# Patient Record
Sex: FEMALE | Race: WHITE | Employment: UNEMPLOYED | URBAN - METROPOLITAN AREA
[De-identification: names, ages, dates, MRNs, and addresses within clinical notes are randomized per-mention and may not be internally consistent; named-entity substitution may affect disease eponyms.]

---

## 2020-08-27 NOTE — PROGRESS NOTES
[facility] [employer] / [facility] ([facility]) [address] [facility], [address]    Acknowledgment of Informed Consent for Surgical or Medical Procedure and Sedation  I agree to allow doctor(s) CRISTHIAN YAO and his/her associates or assistants, including residents and/or other qualified medical practitioner to perform the following medical treatment or procedure and to administer or direct the administration of sedation as necessary:  Procedure(s): PLANTAR PLATE REPAIR RIGHT FOOT, WEIL OSTEOTOMY RIGHT 2ND DIGIT, METATARSAL PHALANGEAL JOINT CAPSULOTOMY  My doctor has explained the following regarding the proposed procedure:   the explanation of the procedure   the benefits of the procedure   the potential problems that might occur during recuperation   the risks and side effects of the procedure which could include but are not limited to severe blood loss, infection, stroke or death   the benefits, risks and side effect of alternative procedures including the consequences of declining this procedure or any alternative procedures   the likelihood of achieving satisfactory results. I acknowledge no guarantee or assurance has been made to me regarding the results. I understand that during the course of this treatment/procedure, unforeseen conditions can occur which require an additional or different procedure. I agree to allow my physician or assistants to perform such extension of the original procedure as they may find necessary. I understand that sedation will often result in temporary impairment of memory and fine motor skills and that sedation can occasionally progress to a state of deep sedation or general anesthesia. I understand the risks of anesthesia for surgery include, but are not limited to, sore throat, hoarseness, injury to face, mouth, or teeth; nausea; headache; injury to blood vessels or nerves; death, brain damage, or paralysis.     I understand that if I have a Limitation of Treatment order in effect during my hospitalization, the order may or may not be in effect during this procedure. I give my doctor permission to give me blood or blood products. I understand that there are risks with receiving blood such as hepatitis, AIDS, fever, or allergic reaction. I acknowledge that the risks, benefits, and alternatives of this treatment have been explained to me and that no express or implied warranty has been given by the hospital, any blood bank, or any person or entity as to the blood or blood components transfused. At the discretion of my doctor, I agree to allow observers, equipment/product representatives and allow photographing, and/or televising of the procedure, provided my name or identity is maintained confidentially. I agree the hospital may dispose of or use for scientific or educational purposes any tissue, fluid, or body parts which may be removed.     ________________________________Date________Time______ am/pm  ([place] [facility])  Patient or Signature of Closest Relative or Legal Guardian    ________________________________Date________Time______am/pm      Page 1 of  1  Witness

## 2020-08-31 ENCOUNTER — NURSE ONLY (OUTPATIENT)
Dept: PRIMARY CARE CLINIC | Age: 35
End: 2020-08-31
Payer: COMMERCIAL

## 2020-08-31 PROCEDURE — 99211 OFF/OP EST MAY X REQ PHY/QHP: CPT | Performed by: NURSE PRACTITIONER

## 2020-09-01 SDOH — HEALTH STABILITY: MENTAL HEALTH: HOW OFTEN DO YOU HAVE A DRINK CONTAINING ALCOHOL?: NEVER

## 2020-09-01 NOTE — PROGRESS NOTES
[facility] PRE-SURGICAL TESTING INSTRUCTIONS                              PRIOR TO PROCEDURE DATE:  1. Please follow any guidelines/instructions prior to your procedure as advised by your surgeon. 2. Arrange for someone to drive you home and be with you for the first 24 hours after discharge for your safety after your procedure for which you received sedation. Ensure it is someone we can share information with regarding your discharge. 3. You must contact your surgeon for instructions IF:   You are taking any blood thinners, aspirin, anti-inflammatory or vitamin E.   There is a change in your physical condition such as a cold, fever, rash, cuts, sores or any other infection, especially near your surgical site. 4. Do not drink alcohol the day before or day of your procedure. 5. A Pre-op History and Physical for surgery MUST be completed by your Physician or Urgent Care within 30 days of your procedure date. Please bring a copy with you on the day of your procedure and along with any other testing performed. THE DAY OF YOUR PROCEDURE:  1. Follow instructions for ARRIVAL TIME as DIRECTED BY YOUR SURGEON. I    2. Enter the MAIN entrance from [email or web address] and follow the signs to the free [place] or [employer] parking (offered free of charge 6am-5pm). 3. Enter the Main Entrance of the hospital (do not enter from the lower level of the parking garage). Upon entrance, check in with the  at the main desk on your left. If no one is available at the desk, proceed into the [facility] Waiting Room and go through the door directly into the [facility]. There is a Check-in desk ACROSS from Room 5 (marked with a sign hanging from the ceiling). The phone number for the surgery center is [phone]. 4. Please call [phone] option #2 option #2 if you have not been preregistered yet. On the day of your procedure bring your insurance card and photo ID.  You will be registered at your bedside once brought back to your room. 5. DO NOT EAT ANYTHING eight hours prior to surgery. May have 8 ounces of water 4 hours prior to surgery. 6. MEDICATIONS    Take the following medications with a SMALL sip of water:    Use your usual dose of inhalers the morning of surgery. BRING your rescue inhaler with you to hospital.    Anesthesia does NOT want you to take insulin the morning of surgery. They will control your blood sugar while you are at the hospital. Please contact your ordering physician for instructions regarding your insulin the night before your procedure. If you have an insulin pump, please keep it set on basal rate. 7. Do not swallow water when brushing teeth. No gum, candy, mints or ice chips. Refrain from smoking or at least decrease the amount. 8. Dress in loose, comfortable clothing appropriate for redressing after your procedure. Do not wear jewelry (including body piercings), make-up (especially NO eye make-up), fingernail polish (NO toenail polish if foot/leg surgery), lotion, powders or metal hairclips. 9. Dentures, glasses, or contacts will need to be removed before your procedure. Bring cases for your glasses, contacts, dentures, or hearing aids to protect them while you are in surgery. 10. If you use a CPAP, please bring it with you on the day of your procedure. 11. We recommend that valuable personal  belongings such as cash, cell phones, e-tablets or jewelry, be left at home during your stay. The hospital will not be responsible for valuables that are not secured in the hospital safe. However, if your insurance requires a co-pay, you may want to bring a method of payment, i.e. Check or credit card, if you wish to pay your co-pay the day of surgery. 12. If you are to stay overnight, you may bring a bag with personal items.  Please have any large items you may need brought in by your family after your arrival to your hospital room.    13. If you have a Living Will or Durable Power of , please bring a copy on the day of your procedure. 15. With your permission, one family member may accompany you while you are being prepared for surgery. Once you are ready, additional family members may join you. HOW WE KEEP YOU SAFE and WORK TO PREVENT SURGICAL SITE INFECTIONS:  1. Health care workers should always check your ID bracelet to verify your name and birth date. You will be asked many times to state your name, date of birth, and allergies. 2. Health care workers should always clean their hands with soap or alcohol gel before providing care to you. It is okay to ask anyone if they cleaned their hands before they touch you. 3. You will be actively involved in verifying the type of procedure you are having and ensuring the correct surgical site. This will be confirmed multiple times prior to your procedure. Do NOT eren your surgery site UNLESS instructed to by your surgeon. 4. Do not shave or wax for 72 hours prior to procedure near your operative site. Shaving with a razor can irritate your skin and make it easier to develop an infection. On the day of your procedure, any hair that needs to be removed near the surgical site will be clipped by a healthcare worker using a special clippers designed to avoid skin irritation. 5. When you are in the operating room, your surgical site will be cleansed with a special soap, and in most cases, you will be given an antibiotic before the surgery begins. What to expect AFTER YOUR PROCEDURE:  1. Immediately following your procedure, your will be taken to the PACU for the first phase of your recovery. Your nurse will help you recover from any potential side effects of anesthesia, such as extreme drowsiness, changes in your vital signs or breathing patterns. Nausea, headache, muscle aches, or sore throat may also occur after anesthesia.   Your nurse will help you manage these

## 2020-09-01 NOTE — PROGRESS NOTES
Pt states she is having H&P done at urgent [facility] [place]. PAT fax given, pt advised she must fax us H&P.    9-3 Spoke with urgent [facility] [place], pt has not been in since July.  LM for pt to call us back to let us know where she went for H&P

## 2020-09-03 ENCOUNTER — ANESTHESIA EVENT (OUTPATIENT)
Dept: OPERATING ROOM | Age: 35
End: 2020-09-03
Payer: COMMERCIAL

## 2020-09-04 ENCOUNTER — HOSPITAL ENCOUNTER (OUTPATIENT)
Age: 35
Setting detail: OUTPATIENT SURGERY
Discharge: HOME | End: 2020-09-04
Attending: PODIATRIST | Admitting: PODIATRIST
Payer: COMMERCIAL

## 2020-09-04 ENCOUNTER — ANESTHESIA (OUTPATIENT)
Dept: OPERATING ROOM | Age: 35
End: 2020-09-04
Payer: COMMERCIAL

## 2020-09-04 ENCOUNTER — APPOINTMENT (OUTPATIENT)
Dept: GENERAL RADIOLOGY | Age: 35
End: 2020-09-04
Attending: PODIATRIST
Payer: COMMERCIAL

## 2020-09-04 VITALS
DIASTOLIC BLOOD PRESSURE: 52 MMHG | TEMPERATURE: 96.8 F | RESPIRATION RATE: 16 BRPM | OXYGEN SATURATION: 99 % | SYSTOLIC BLOOD PRESSURE: 97 MMHG

## 2020-09-04 VITALS
RESPIRATION RATE: 18 BRPM | BODY MASS INDEX: 36.1 KG/M2 | DIASTOLIC BLOOD PRESSURE: 72 MMHG | OXYGEN SATURATION: 95 % | HEART RATE: 88 BPM | WEIGHT: 230 LBS | TEMPERATURE: 97.1 F | HEIGHT: 67 IN | SYSTOLIC BLOOD PRESSURE: 123 MMHG

## 2020-09-04 LAB
HCT VFR BLD CALC: 42.4 % (ref 36–48)
HEMOGLOBIN: 14.3 G/DL (ref 12–16)
MCH RBC QN AUTO: 30.5 PG (ref 26–34)
MCHC RBC AUTO-ENTMCNC: 33.7 G/DL (ref 31–36)
MCV RBC AUTO: 90.4 FL (ref 80–100)
PDW BLD-RTO: 13.8 % (ref 12.4–15.4)
PLATELET # BLD: 225 K/UL (ref 135–450)
PMV BLD AUTO: 8.4 FL (ref 5–10.5)
RBC # BLD: 4.69 M/UL (ref 4–5.2)
WBC # BLD: 8.7 K/UL (ref 4–11)

## 2020-09-04 PROCEDURE — C1713 ANCHOR/SCREW BN/BN,TIS/BN: HCPCS | Performed by: PODIATRIST

## 2020-09-04 PROCEDURE — 73630 X-RAY EXAM OF FOOT: CPT

## 2020-09-04 PROCEDURE — 3600000014 HC SURGERY LEVEL 4 ADDTL 15MIN: Performed by: PODIATRIST

## 2020-09-04 PROCEDURE — 6360000002 HC RX W HCPCS: Performed by: NURSE ANESTHETIST, CERTIFIED REGISTERED

## 2020-09-04 PROCEDURE — 2500000003 HC RX 250 WO HCPCS: Performed by: PODIATRIST

## 2020-09-04 PROCEDURE — 7100000000 HC PACU RECOVERY - FIRST 15 MIN: Performed by: PODIATRIST

## 2020-09-04 PROCEDURE — 3700000001 HC ADD 15 MINUTES (ANESTHESIA): Performed by: PODIATRIST

## 2020-09-04 PROCEDURE — 7100000001 HC PACU RECOVERY - ADDTL 15 MIN: Performed by: PODIATRIST

## 2020-09-04 PROCEDURE — 3600000004 HC SURGERY LEVEL 4 BASE: Performed by: PODIATRIST

## 2020-09-04 PROCEDURE — 2709999900 HC NON-CHARGEABLE SUPPLY: Performed by: PODIATRIST

## 2020-09-04 PROCEDURE — 6370000000 HC RX 637 (ALT 250 FOR IP): Performed by: PODIATRIST

## 2020-09-04 PROCEDURE — 2580000003 HC RX 258: Performed by: PODIATRIST

## 2020-09-04 PROCEDURE — 7100000010 HC PHASE II RECOVERY - FIRST 15 MIN: Performed by: PODIATRIST

## 2020-09-04 PROCEDURE — 2720000010 HC SURG SUPPLY STERILE: Performed by: PODIATRIST

## 2020-09-04 PROCEDURE — 7100000011 HC PHASE II RECOVERY - ADDTL 15 MIN: Performed by: PODIATRIST

## 2020-09-04 PROCEDURE — 2580000003 HC RX 258: Performed by: ANESTHESIOLOGY

## 2020-09-04 PROCEDURE — 2580000003 HC RX 258: Performed by: NURSE ANESTHETIST, CERTIFIED REGISTERED

## 2020-09-04 PROCEDURE — 6360000002 HC RX W HCPCS: Performed by: PODIATRIST

## 2020-09-04 PROCEDURE — 85027 COMPLETE CBC AUTOMATED: CPT

## 2020-09-04 PROCEDURE — 3700000000 HC ANESTHESIA ATTENDED CARE: Performed by: PODIATRIST

## 2020-09-04 DEVICE — IMPLANTABLE DEVICE
Type: IMPLANTABLE DEVICE | Site: FOOT | Status: FUNCTIONAL
Brand: GRAVITY

## 2020-09-04 DEVICE — GRAFT HUM TISS W2XL4CM THCK AMNIO BARR MEM CHORION BASE: Type: IMPLANTABLE DEVICE | Site: FOOT | Status: FUNCTIONAL

## 2020-09-04 RX ORDER — LABETALOL 20 MG/4 ML (5 MG/ML) INTRAVENOUS SYRINGE
5 EVERY 10 MIN PRN
Status: DISCONTINUED | OUTPATIENT
Start: 2020-09-04 | End: 2020-09-04 | Stop reason: HOSPADM

## 2020-09-04 RX ORDER — ENALAPRILAT 2.5 MG/2ML
1.25 INJECTION INTRAVENOUS
Status: DISCONTINUED | OUTPATIENT
Start: 2020-09-04 | End: 2020-09-04 | Stop reason: HOSPADM

## 2020-09-04 RX ORDER — ONDANSETRON 2 MG/ML
INJECTION INTRAMUSCULAR; INTRAVENOUS PRN
Status: DISCONTINUED | OUTPATIENT
Start: 2020-09-04 | End: 2020-09-04 | Stop reason: SDUPTHER

## 2020-09-04 RX ORDER — GABAPENTIN 300 MG/1
300 CAPSULE ORAL NIGHTLY
Qty: 7 CAPSULE | Refills: 0 | Status: SHIPPED | OUTPATIENT
Start: 2020-09-04 | End: 2022-07-29 | Stop reason: ALTCHOICE

## 2020-09-04 RX ORDER — ONDANSETRON 2 MG/ML
4 INJECTION INTRAMUSCULAR; INTRAVENOUS
Status: DISCONTINUED | OUTPATIENT
Start: 2020-09-04 | End: 2020-09-04 | Stop reason: HOSPADM

## 2020-09-04 RX ORDER — LIDOCAINE HYDROCHLORIDE 10 MG/ML
1 INJECTION, SOLUTION EPIDURAL; INFILTRATION; INTRACAUDAL; PERINEURAL
Status: DISCONTINUED | OUTPATIENT
Start: 2020-09-04 | End: 2020-09-04 | Stop reason: HOSPADM

## 2020-09-04 RX ORDER — SODIUM CHLORIDE, SODIUM LACTATE, POTASSIUM CHLORIDE, CALCIUM CHLORIDE 600; 310; 30; 20 MG/100ML; MG/100ML; MG/100ML; MG/100ML
INJECTION, SOLUTION INTRAVENOUS CONTINUOUS PRN
Status: DISCONTINUED | OUTPATIENT
Start: 2020-09-04 | End: 2020-09-04 | Stop reason: SDUPTHER

## 2020-09-04 RX ORDER — CLINDAMYCIN PHOSPHATE 900 MG/50ML
900 INJECTION INTRAVENOUS
Status: COMPLETED | OUTPATIENT
Start: 2020-09-04 | End: 2020-09-04

## 2020-09-04 RX ORDER — MAGNESIUM HYDROXIDE 1200 MG/15ML
LIQUID ORAL CONTINUOUS PRN
Status: COMPLETED | OUTPATIENT
Start: 2020-09-04 | End: 2020-09-04

## 2020-09-04 RX ORDER — HYDROMORPHONE HCL 110MG/55ML
PATIENT CONTROLLED ANALGESIA SYRINGE INTRAVENOUS PRN
Status: DISCONTINUED | OUTPATIENT
Start: 2020-09-04 | End: 2020-09-04 | Stop reason: SDUPTHER

## 2020-09-04 RX ORDER — SODIUM CHLORIDE, SODIUM LACTATE, POTASSIUM CHLORIDE, CALCIUM CHLORIDE 600; 310; 30; 20 MG/100ML; MG/100ML; MG/100ML; MG/100ML
INJECTION, SOLUTION INTRAVENOUS CONTINUOUS
Status: DISCONTINUED | OUTPATIENT
Start: 2020-09-04 | End: 2020-09-04 | Stop reason: HOSPADM

## 2020-09-04 RX ORDER — LIDOCAINE HYDROCHLORIDE 20 MG/ML
INJECTION, SOLUTION INTRAVENOUS PRN
Status: DISCONTINUED | OUTPATIENT
Start: 2020-09-04 | End: 2020-09-04 | Stop reason: SDUPTHER

## 2020-09-04 RX ORDER — MIDAZOLAM HYDROCHLORIDE 1 MG/ML
INJECTION INTRAMUSCULAR; INTRAVENOUS PRN
Status: DISCONTINUED | OUTPATIENT
Start: 2020-09-04 | End: 2020-09-04 | Stop reason: SDUPTHER

## 2020-09-04 RX ORDER — OXYCODONE HYDROCHLORIDE AND ACETAMINOPHEN 5; 325 MG/1; MG/1
1 TABLET ORAL
Status: COMPLETED | OUTPATIENT
Start: 2020-09-04 | End: 2020-09-04

## 2020-09-04 RX ORDER — SODIUM CHLORIDE 0.9 % (FLUSH) 0.9 %
10 SYRINGE (ML) INJECTION EVERY 12 HOURS SCHEDULED
Status: DISCONTINUED | OUTPATIENT
Start: 2020-09-04 | End: 2020-09-04 | Stop reason: HOSPADM

## 2020-09-04 RX ORDER — FENTANYL CITRATE 50 UG/ML
50 INJECTION, SOLUTION INTRAMUSCULAR; INTRAVENOUS EVERY 5 MIN PRN
Status: DISCONTINUED | OUTPATIENT
Start: 2020-09-04 | End: 2020-09-04 | Stop reason: HOSPADM

## 2020-09-04 RX ORDER — SODIUM CHLORIDE 0.9 % (FLUSH) 0.9 %
10 SYRINGE (ML) INJECTION PRN
Status: DISCONTINUED | OUTPATIENT
Start: 2020-09-04 | End: 2020-09-04 | Stop reason: HOSPADM

## 2020-09-04 RX ORDER — OXYCODONE HYDROCHLORIDE AND ACETAMINOPHEN 5; 325 MG/1; MG/1
1 TABLET ORAL EVERY 8 HOURS PRN
Qty: 21 TABLET | Refills: 0 | Status: SHIPPED | OUTPATIENT
Start: 2020-09-04 | End: 2020-09-11

## 2020-09-04 RX ORDER — PROPOFOL 10 MG/ML
INJECTION, EMULSION INTRAVENOUS PRN
Status: DISCONTINUED | OUTPATIENT
Start: 2020-09-04 | End: 2020-09-04 | Stop reason: SDUPTHER

## 2020-09-04 RX ORDER — FENTANYL CITRATE 50 UG/ML
25 INJECTION, SOLUTION INTRAMUSCULAR; INTRAVENOUS EVERY 5 MIN PRN
Status: DISCONTINUED | OUTPATIENT
Start: 2020-09-04 | End: 2020-09-04 | Stop reason: HOSPADM

## 2020-09-04 RX ORDER — IBUPROFEN 800 MG/1
800 TABLET ORAL EVERY 8 HOURS PRN
Qty: 21 TABLET | Refills: 0 | Status: SHIPPED | OUTPATIENT
Start: 2020-09-04 | End: 2022-08-09

## 2020-09-04 RX ORDER — FENTANYL CITRATE 50 UG/ML
INJECTION, SOLUTION INTRAMUSCULAR; INTRAVENOUS PRN
Status: DISCONTINUED | OUTPATIENT
Start: 2020-09-04 | End: 2020-09-04 | Stop reason: SDUPTHER

## 2020-09-04 RX ORDER — HYDRALAZINE HYDROCHLORIDE 20 MG/ML
5 INJECTION INTRAMUSCULAR; INTRAVENOUS EVERY 5 MIN PRN
Status: DISCONTINUED | OUTPATIENT
Start: 2020-09-04 | End: 2020-09-04 | Stop reason: HOSPADM

## 2020-09-04 RX ORDER — DEXAMETHASONE SODIUM PHOSPHATE 4 MG/ML
INJECTION, SOLUTION INTRA-ARTICULAR; INTRALESIONAL; INTRAMUSCULAR; INTRAVENOUS; SOFT TISSUE PRN
Status: DISCONTINUED | OUTPATIENT
Start: 2020-09-04 | End: 2020-09-04 | Stop reason: SDUPTHER

## 2020-09-04 RX ADMIN — SODIUM CHLORIDE, POTASSIUM CHLORIDE, SODIUM LACTATE AND CALCIUM CHLORIDE: 600; 310; 30; 20 INJECTION, SOLUTION INTRAVENOUS at 08:49

## 2020-09-04 RX ADMIN — CLINDAMYCIN PHOSPHATE 900 MG: 18 INJECTION, SOLUTION INTRAVENOUS at 10:50

## 2020-09-04 RX ADMIN — SODIUM CHLORIDE, SODIUM LACTATE, POTASSIUM CHLORIDE, AND CALCIUM CHLORIDE: 600; 310; 30; 20 INJECTION, SOLUTION INTRAVENOUS at 11:48

## 2020-09-04 RX ADMIN — PROPOFOL 50 MG: 10 INJECTION, EMULSION INTRAVENOUS at 10:46

## 2020-09-04 RX ADMIN — FENTANYL CITRATE 100 MCG: 50 INJECTION INTRAMUSCULAR; INTRAVENOUS at 10:50

## 2020-09-04 RX ADMIN — OXYCODONE HYDROCHLORIDE AND ACETAMINOPHEN 1 TABLET: 5; 325 TABLET ORAL at 13:13

## 2020-09-04 RX ADMIN — DEXAMETHASONE SODIUM PHOSPHATE 4 MG: 4 INJECTION, SOLUTION INTRAMUSCULAR; INTRAVENOUS at 11:10

## 2020-09-04 RX ADMIN — PROPOFOL 250 MG: 10 INJECTION, EMULSION INTRAVENOUS at 10:45

## 2020-09-04 RX ADMIN — FENTANYL CITRATE 100 MCG: 50 INJECTION INTRAMUSCULAR; INTRAVENOUS at 11:30

## 2020-09-04 RX ADMIN — PROPOFOL 50 MG: 10 INJECTION, EMULSION INTRAVENOUS at 10:50

## 2020-09-04 RX ADMIN — MIDAZOLAM HYDROCHLORIDE 2 MG: 2 INJECTION, SOLUTION INTRAMUSCULAR; INTRAVENOUS at 10:38

## 2020-09-04 RX ADMIN — LIDOCAINE HYDROCHLORIDE 100 MG: 20 INJECTION, SOLUTION INTRAVENOUS at 10:45

## 2020-09-04 RX ADMIN — SODIUM CHLORIDE, SODIUM LACTATE, POTASSIUM CHLORIDE, AND CALCIUM CHLORIDE: 600; 310; 30; 20 INJECTION, SOLUTION INTRAVENOUS at 10:33

## 2020-09-04 RX ADMIN — PROPOFOL 50 MG: 10 INJECTION, EMULSION INTRAVENOUS at 11:55

## 2020-09-04 RX ADMIN — HYDROMORPHONE HYDROCHLORIDE 1 MG: 2 INJECTION, SOLUTION INTRAMUSCULAR; INTRAVENOUS; SUBCUTANEOUS at 12:12

## 2020-09-04 RX ADMIN — ONDANSETRON 4 MG: 2 INJECTION INTRAMUSCULAR; INTRAVENOUS at 11:10

## 2020-09-04 ASSESSMENT — PULMONARY FUNCTION TESTS
PIF_VALUE: 8
PIF_VALUE: 8
PIF_VALUE: 14
PIF_VALUE: 14
PIF_VALUE: 0
PIF_VALUE: 6
PIF_VALUE: 1
PIF_VALUE: 13
PIF_VALUE: 11
PIF_VALUE: 7
PIF_VALUE: 8
PIF_VALUE: 7
PIF_VALUE: 6
PIF_VALUE: 7
PIF_VALUE: 7
PIF_VALUE: 9
PIF_VALUE: 14
PIF_VALUE: 6
PIF_VALUE: 8
PIF_VALUE: 23
PIF_VALUE: 8
PIF_VALUE: 7
PIF_VALUE: 7
PIF_VALUE: 14
PIF_VALUE: 6
PIF_VALUE: 7
PIF_VALUE: 8
PIF_VALUE: 8
PIF_VALUE: 14
PIF_VALUE: 7
PIF_VALUE: 7
PIF_VALUE: 14
PIF_VALUE: 5
PIF_VALUE: 7
PIF_VALUE: 7
PIF_VALUE: 6
PIF_VALUE: 7
PIF_VALUE: 8
PIF_VALUE: 7
PIF_VALUE: 0
PIF_VALUE: 6
PIF_VALUE: 14
PIF_VALUE: 14
PIF_VALUE: 2
PIF_VALUE: 7
PIF_VALUE: 0
PIF_VALUE: 8
PIF_VALUE: 8
PIF_VALUE: 14
PIF_VALUE: 7
PIF_VALUE: 7
PIF_VALUE: 9
PIF_VALUE: 7
PIF_VALUE: 6
PIF_VALUE: 7
PIF_VALUE: 13
PIF_VALUE: 7
PIF_VALUE: 8
PIF_VALUE: 10
PIF_VALUE: 7
PIF_VALUE: 0
PIF_VALUE: 7
PIF_VALUE: 4
PIF_VALUE: 11
PIF_VALUE: 8
PIF_VALUE: 7
PIF_VALUE: 7
PIF_VALUE: 14
PIF_VALUE: 14
PIF_VALUE: 8
PIF_VALUE: 6
PIF_VALUE: 7
PIF_VALUE: 6
PIF_VALUE: 7
PIF_VALUE: 7
PIF_VALUE: 8
PIF_VALUE: 8
PIF_VALUE: 5
PIF_VALUE: 9
PIF_VALUE: 1
PIF_VALUE: 9
PIF_VALUE: 8
PIF_VALUE: 5
PIF_VALUE: 5
PIF_VALUE: 7
PIF_VALUE: 6
PIF_VALUE: 7
PIF_VALUE: 5
PIF_VALUE: 5
PIF_VALUE: 7
PIF_VALUE: 8
PIF_VALUE: 7
PIF_VALUE: 14
PIF_VALUE: 14
PIF_VALUE: 8
PIF_VALUE: 8
PIF_VALUE: 7
PIF_VALUE: 7
PIF_VALUE: 14
PIF_VALUE: 13
PIF_VALUE: 7
PIF_VALUE: 0

## 2020-09-04 ASSESSMENT — PAIN DESCRIPTION - DESCRIPTORS: DESCRIPTORS: THROBBING

## 2020-09-04 ASSESSMENT — PAIN SCALES - GENERAL
PAINLEVEL_OUTOF10: 7
PAINLEVEL_OUTOF10: 5
PAINLEVEL_OUTOF10: 0

## 2020-09-04 ASSESSMENT — PAIN DESCRIPTION - ORIENTATION: ORIENTATION: RIGHT

## 2020-09-04 ASSESSMENT — PAIN - FUNCTIONAL ASSESSMENT: PAIN_FUNCTIONAL_ASSESSMENT: 0-10

## 2020-09-04 ASSESSMENT — PAIN DESCRIPTION - LOCATION: LOCATION: LEG

## 2020-09-04 ASSESSMENT — PAIN DESCRIPTION - PAIN TYPE: TYPE: ACUTE PAIN;SURGICAL PAIN

## 2020-09-04 NOTE — PROGRESS NOTES
Ambulatory Surgery/Procedure Discharge Note    Vitals:    09/04/20 1342   BP: 123/72   Pulse: 88   Resp: 18   Temp: 97.1 °F (36.2 °C)   SpO2: 95%     Patient meets discharge criteria based on Jeniffer score. In: 2596 [P.O.:240; I.V.:2356]  Out: -     Restroom use offered before discharge. Yes    Pain assessment:  none  Pain Level: 0    Discharge instructions reviewed with patients . 3 prescriptions given to be filled at pharmacy of choice. Ice packs given, Dressing clean, dry and intact. All needs met at this time. Patient discharged to home/self care.  Patient discharged via wheel chair by transporter to waiting family/S.O.       9/4/2020 2:17 PM

## 2020-09-04 NOTE — OP NOTE
Operative Note      Patient: Arik Flores  YOB: 1985  MRN: [id number]    Date of Procedure: 9/4/2020     Pre-Op Diagnosis: Sprain of metatarsophalangeal joint Flexion deformity, right ankle and toes [M21.271], Flexion deformity, right ankle and toes [M21.271], Subluxation of metatarsophalangeal joint of lesser toe of right foot, initial encounter [S93.144A]     Post-Op Diagnosis: Same       Procedure(s):  PLANTAR PLATE REPAIR RIGHT FOOT, WEIL OSTEOTOMY RIGHT 2ND DIGIT, METATARSAL PHALANGEAL JOINT CAPSULOTOMY     Surgeon(s):  Carter Alanis DPM     Assistant:  Resident: EBONIE Meyer MS4     Anesthesia: General     Injectables: pre-op 10 cc 1% Polocaine and post-op 10 cc 1% Polocaine      Hemostasis: pneumatic ankle tourniquet at 250 mmHg for 60 minutes     Materials: 3-0 Vicryl, 4-0 Vicryl, 5-0 Vicryl, [employer] Plantar Plate Repair Implant Set, 2.0 x 12 mm Snap off screw, Actishield Amniotic Barrier Membrane       Estimated Blood Loss: less than 5 cc     Complications: None     Specimens: Was Not Obtained     Findings: as expected, see op note    INDICATIONS FOR PROCEDURE: This patient has signs and symptoms clinically and radiographically consistent with the above mentioned preoperative diagnosis. Having failed conservative treatment, it was determined that the patient would benefit from surgical intervention. All potential risks, benefits, and complications were discussed with the patient prior to the scheduling of surgery. All the patient's questions were answered and no guarantees were given. The patient wished to proceed with surgery, and informed written consent was obtained. DETAILS OF PROCEDURE: The patient was brought from the pre-operative area and placed on the operating table in the supine position. A pneumatic ankle tourniquet was placed around the patient's well-padded right lower extremity.  Following IV sedation, a local anesthetic block was then injected proximal to the incision site consisting of 10 cc of 1% Polocaine plain. The right lower extremity was then scrubbed, prepped, and draped in the usual sterile fashion. A time-out was performed. The patient, procedure, and operative site were confirmed. An Esmarch bandage was then utilized to exsanguinate the patient's right lower extremity. The tourniquet was then inflated to 250 mmHg and the following procedure was performed. PROCEDURE #1-3 PLANTAR PLATE REPAIR, WEIL OSTEOTOMY WITH METATARSAL PHALANGEAL JOINT CAPSULOTOMY, RIGHT FOOT  Attention was directed to the dorsal aspect of the right foot where a 4 cm curvilinear incision line was drawn with a sterile marking pen over the dorsal aspect of the 2nd metatarsal and centering over the metarsophalangeal joint. The incision was deepened through the subcutaneous layer with care being taken to identify and retract all vital neurovascular structures. All venous tributaries were electrocoagulated as encountered. The extensor tendon was identified and retracted and protected. Sharp and blunt dissection continued to the level of the metatarsophalangeal joint. Using a #15 blade, a transverse capsulotomy was made from dorsal to plantar and medial to lateral at the metatarsophalangeal joint, thereby releasing the capsule. Special care was taken to preserve the attachments of the medial and lateral collateral ligaments to prevent post-operative transverse plane migration of the digit at the level of the metatarsal phalangeal joint. A weightbearing attitude of the joint was then simulated utilizing the Kelikian push-up test, performed by placing a dorsiflexory force on the plantar aspect of the metatarsal head. The previously noted dorsal contracture was further reduced. Attention was then directed to the notably plantarflexed metatarsal, and attention was directed to the osteotomy.      Using a #15 blade, a capsular and periosteal incision was then made linearly over the 2nd metatarsal head. The capsule and periosteal structures were meticulously reflected both dorsally, medially and laterally until excellent soft tissue exposure had been achieved. The articular cartilage of the metatarsal head was then inspected and noted to be white and shiny consistent with healthy cartilage. No signs of cartilage errosion or osteoarthritis was noted. Next, a through and through osteotomy was created beginning in the dorsal 1/3 of the articular cartilage and angled from dorsal-distal to plantar-proximal parallel to the weightbearing surface. Upon completion of the osteotomy, the head of the metatarsal was transposed approximately 3 mm proximally. Next, using a k-wire from the [facility] Plantar Plate Repair Implant instrument tray, temporary fixation was placed across the osteotomy in the head of the 2nd metatarsal perpendicular to the weight bearing surface. Then another k-wire was placed just distal to the previous k-wire at the base of the proximal phalanx with a distraction device in place. The device was used to distract the joint to gain access to the plantar plate. At this time, the plantar plate was well visualized. At this time a linear tear was noted at the midsubstance of the plantar plate. To effectively mobilize and advance the plantar plate back beneath the proximal phalanx, the plantar plate was incised and freed from the flexor tendons with great care to preserve the flexor tendons. Next, using the [employer] Drive was passed into the plantar plate multiple times to provide future attachment to the proximal phalanx. After obtaining satisfactory grab on the plantar plate, the joint distractor was released and k-wire on the proximal phalanx removed. Next, using a k-wire two drill holes were made in the base of the proximal phalanx.  One from dorsal medial to plantar lateral and the other from dorsal lateral to plantar medial. Next a wire loop passer from the instrument tray was passed through each hole and the corresponding fiberwire was grasped and pulled dorsally. The plantar plate was then pulled into the base of the proximal phalanx. Next the 2nd digit was maximally plantarflexed and the fiberwire was tied tightly to the dorsal aspect of the proximal phalanx. At this time excellent correction of the pre-operative dorsal contracture at the level of the metatarsophalangeal joint was noted. Attention was then directed towards completion of the Weil osteotomy. Utilizing modified AO fixation principle with lag technique and the manufactures recommended technique, a 2.0 snap-off 12 mm screw was inserted from dorsal to plantar across the osteotomy site in a perpendicular orientation. The temporary fixation was removed, and excellent bone to bone apposition was noted at the osteotomy. The remaining articular cartilage overhanging transversely following the previously performed Weil osteotomy was resected utilizing a bone rongeur. The wound was then copiously lavaged with sterile normal saline. Next, the Actishield Amniotic Barrier Membrane was placed overlying the bone per 's instructions. Next, the subcutaneous tissues were reapproximated using 3-0 Vicryl. The skin was then closed using a running subcuticular suture technique using 4-0 and 5-0 Vicryl. At this time, a local anesthetic was injected about the incision sites consisting of 10 cc 1% Polocaine plain, for the patient's postoperative comfort. A soft sterile dressing was applied consisting of steri-strips, xeroform, gauze, ca, webril, and ace. The pneumatic ankle tourniquet was rapidly deflated after a total time of 60 minutes and a prompt hyperemic response was noted on all aspects of the patient's right lower extremity.     END OF PROCEDURE: The patient tolerated the procedure and anesthesia well and was transported from the operating room to the PACU with vital signs stable and vascular status intact to all aspects of the patient's right lower extremity and digital capillary refill time immediate to the digits of the right foot. Following a period of post-operative monitoring, the patient will be discharged home with written and oral wound care and follow-up instructions. The patient is to follow-up with Dr. Jonathan Holley in his private office within 5-7 days. The patient is to keep dressing clean, dry and intact at all times. The patient is to call if any complications occur.     This operative report was dictated on behalf of Dr. Jonathan Holley DPM.    Zackary Thakkar DPM  Podiatric Resident PGY2  Pager [phone] or [place]  9/4/2020, 12:50 PM

## 2020-09-04 NOTE — PROGRESS NOTES
Pt arrived from [place], s/p PLANTAR PLATE REPAIR RIGHT FOOT, WEIL OSTEOTOMY RIGHT 2ND DIGIT, METATARSAL PHALANGEAL JOINT CAPSULOTOMY - Right , report received from CRNA, pt very sleepy on arrival, pt received 1 mg of dilaudid in [place], pt also had a pre-op block, resident here upon arrival

## 2020-09-04 NOTE — PROGRESS NOTES
PACU Transfer to [facility]  Pt's Current Allergies: Hydrocodone-acetaminophen    Pt meets criteria to transfer to next phase of care per CAYDEN SCORE and ASPAN standards        Vitals:    09/04/20 1315   BP: 119/80   Pulse: 91   Resp: 20   Temp: 97 °F (36.1 °C)   SpO2: 94%      BP within 20% of pt's admitting BP as per Cayden Score      Intake/Output Summary (Last 24 hours) at 9/4/2020 1337  Last data filed at 9/4/2020 1325  Gross per 24 hour   Intake 2596 ml   Output --   Net 2596 ml       Asked patient if needed to use the bathroom prior to transfer to [facility]. Pt denies need. Pain assessment:  present - adequately treated  Pain level- 5    Patient transferred to care of [facility] RN.   Family updated and directed to [facility]    9/4/2020 1:37 PM

## 2020-09-11 NOTE — H&P
Agree with current H&P performed prior to procedure, no further updates    Italia Diaz DPM  [facility]  [phone]

## 2020-09-25 LAB — SARS-COV-2, NAA: NOT DETECTED

## 2022-07-29 ENCOUNTER — OFFICE VISIT (OUTPATIENT)
Dept: SURGERY | Age: 37
End: 2022-07-29
Payer: COMMERCIAL

## 2022-07-29 ENCOUNTER — TELEPHONE (OUTPATIENT)
Dept: SURGERY | Age: 37
End: 2022-07-29

## 2022-07-29 VITALS
SYSTOLIC BLOOD PRESSURE: 138 MMHG | BODY MASS INDEX: 45.99 KG/M2 | TEMPERATURE: 98.1 F | OXYGEN SATURATION: 97 % | WEIGHT: 293 LBS | HEIGHT: 67 IN | DIASTOLIC BLOOD PRESSURE: 86 MMHG | HEART RATE: 86 BPM | RESPIRATION RATE: 16 BRPM

## 2022-07-29 DIAGNOSIS — K64.9 HEMORRHOIDS, UNSPECIFIED HEMORRHOID TYPE: Primary | ICD-10-CM

## 2022-07-29 PROCEDURE — 1036F TOBACCO NON-USER: CPT | Performed by: SURGERY

## 2022-07-29 PROCEDURE — 99203 OFFICE O/P NEW LOW 30 MIN: CPT | Performed by: SURGERY

## 2022-07-29 PROCEDURE — G8417 CALC BMI ABV UP PARAM F/U: HCPCS | Performed by: SURGERY

## 2022-07-29 PROCEDURE — G8427 DOCREV CUR MEDS BY ELIG CLIN: HCPCS | Performed by: SURGERY

## 2022-07-29 RX ORDER — DEXTROAMPHETAMINE SACCHARATE, AMPHETAMINE ASPARTATE, DEXTROAMPHETAMINE SULFATE AND AMPHETAMINE SULFATE 7.5; 7.5; 7.5; 7.5 MG/1; MG/1; MG/1; MG/1
30 TABLET ORAL DAILY
COMMUNITY

## 2022-07-29 RX ORDER — BUPROPION HYDROCHLORIDE 100 MG/1
100 TABLET ORAL 2 TIMES DAILY
Status: ON HOLD | COMMUNITY
End: 2022-08-09

## 2022-07-29 NOTE — Clinical Note
[facility] Colon and Rectal Surgery  [address]  [address] [place] [address]  Phone: [phone]  Fax: [phone]    Padmini Canales MD        July 29, 2022     Patient: Karin Antonio   YOB: 1985   Date of Visit: 7/29/2022       To Whom It May Concern: It is my medical opinion that Karin Antonio {Work release (duty restriction):87963}. If you have any questions or concerns, please don't hesitate to call.     Sincerely,        Padmini Canales MD

## 2022-07-29 NOTE — TELEPHONE ENCOUNTER
Patient has been scheduled for:    Procedure: hemorrhoidectomy  Date: 8/9/22  Time: 1:20pm  Arrival: 11:20am  [facility]  [facility] ibuprofen 7 days   Prep? npo    Pre-op? Post-op Appt? She wanted Mercedes Mcintosh, it was not available around the time of her post-op. EAST 9/2/22 at 11:00AM    Patient advised they will need a . Orders routed to surgery scheduling. Instructions have been mailed/emailed to:  [email or web address]

## 2022-07-29 NOTE — PROGRESS NOTES
Subjective:     Patient is a 43 y.o. woman with hemorrhoids     HPI: Ms. Keegan Tamez was seen in ER in April for hemorrhoid thrombosis. She reports a long history of intermittent hemorrhoid flares. She does not feel she can live with these symptoms and more given the pain and bleeding. She had a prior hysterectomy + BSO. Had severe bout of constipation last year where she did not pass stool x 7-10 days. Constipation not as severe since then. No past medical history on file. Past Surgical History:   Procedure Laterality Date    CARPAL TUNNEL RELEASE      FACIAL SURGERY      HYSTERECTOMY      OSTEOTOMY Right 2020    PLANTAR PLATE REPAIR RIGHT FOOT, WEIL OSTEOTOMY RIGHT 2ND DIGIT, METATARSAL PHALANGEAL JOINT CAPSULOTOMY performed by Lubna Huntley DPM at [address]      Not in a hospital admission. Allergies   Allergen Reactions    Hydrocodone-Acetaminophen Nausea And Vomiting      Social History     Tobacco Use    Smoking status: Former     Types: Cigarettes     Quit date: 2020     Years since quittin.9    Smokeless tobacco: Never    Tobacco comments:     smoked . 5 pk/day until 2020   Substance Use Topics    Alcohol use: Never      FH: GM had ovarian cancer     Review of Systems    GEN: reviewed and negative except as noted in HPI. GI: reviewed and negative except as noted in HPI. Objective:     GEN: appears well, no distress, appears stated age  PSYCH: normal mood, normal affect  NECK: no neck masses, trachea midline  ENT: moist oral mucosa; anicteric  SKIN: no rash or jaundice  CV: regular heart rate and rhythm  PULM: normal respiratory effort, no wheezing  GI: soft non tender abdomen. Normal bowel sounds  RECTAL: large external hemorrhoids with tags.  Exam with Kinsey Paul RN present   EXT/NEURO: normal gait, strength/sensation grossly intact in all extremities      Assessment:     Hemorrhoids     Plan:     Discussed given exam findings options are to live with it or surgical removal. She would like to proceed with surgery. The RBA of excisional hemorrhoidectomy were reviewed. Discussed risk of bleeding given hemorrhoids are large blood vessels and that a mild amount of bleeding postoperatively is expected but that significant bleeding can occur and warrant a return trip to the operating room. Discussed small risk of infection. Discussed risk of stenosis if too much hemorrhoid tissue is removed. Discussed because of this small hemorrhoids may be left behind and may need to be removed in staged fashion or left alone if symptoms are better. Discussed because of this goal of hemorrhoid operation is improvement in symptoms. Finally we reviewed pain expectations. Discussed hemorrhoidectomy remains a painful operation and often requires 2-3 weeks of down time and or missed work.  Will schedule       Camelia Han M.D.  7/29/22   11:05 AM

## 2022-07-29 NOTE — LETTER
P - [facility] [address] [place] [address] [phone]  f [phone]    Marichuy Mane MD                        SURGERY ORDER   -- Time of order -- 22    11:39 AM    Facility:   [facility]. # _________________                               Scheduled by: ____________    Surgery Date & Time: 22 at 1:20pm                    Pt arrival: 11:20AM                                                                                      Patient Name:  Alfonso Cruz     :  1985 PCP:  No primary care provider on file. Home Ph:    [phone] (home)                                                     PROCEDURE:  Hemorrhoidectomy  17222    DIAGNOSIS:      ICD-10-CM    1.  Hemorrhoids, unspecified hemorrhoid type  K64.9           Anesthesia: _General  Time Needed:  1.5 hours   Pt Position:  lithotomy         Outpatient _x__  Admit ___                Pre-Op to be done by: _na__  Cardiac Clearance Done by: _na__  Labs Needed: Urine Hcg _had hysterectomy _    Patient to meet with Anesthesiology prior to surgery ___no__     Medications to be stopped 7 days before surgery: Ibuprofen  Additional / Special Orders:     No antibiotic or lovenox indicated  Exparel in room                                                                                                                 Marichuy Mane MD

## 2022-08-03 NOTE — PROGRESS NOTES
PRE-OP INSTRUCTIONS FOR THE SURGICAL PATIENT YOU ARE UNABLE TO MAKE CONTACT FOR AN INTERVIEW:        Follow all instructions provided to you from your surgeons office, including your ARRIVAL TIME. Enter the MAIN entrance located on [address] and report to the desk. Bring your insurance & photo ID with you. You may also be asked to pay a co-pay, as you may want to bring a check or credit card with you. Leave all other valuables at home. Arrange for someone to drive you home and be with you for the first 24 hours after discharge. Bring your medication list with you day of surgery with doses and frequency listed (including over the counter medications)  You must contact your surgeon for Instructions regarding:              - ALL medication instructions, especially if taking blood thinners, aspirin, or diabetic medication.         -Bariatric patients call surgeon if on diabetic medications as some need to be stopped 1 week preop  - IF  there is a change in your physical condition such as a cold, fever, rash, cuts, sores or any other infection, especially near your surgical site. A Pre-op History and Physical for surgery MUST be completed by your Physician or an Urgent Care within 30 days of your procedure date. Please bring a copy with you on the day of your procedure and along with any other testing performed. DO NOT EAT ANYTHING eight hours prior to arrival for surgery. May have up to 8 ounces of water 4 hours prior to arrival for surgery. NOTE: ALL Gastric, Bariatric and Bowel surgery patients MUST follow their surgeon's instructions. No gum, candy, mints, or ice chips day of procedure. Please refrain from drinking alcohol the day before or day of your procedure. Please do not smoke the day of your procedure. Dress in loose, comfortable clothing appropriate for redressing after your procedure.  Do not wear jewelry (including body piercings), make-up, fingernail polish, lotion, powders or metal hairclips. 15. Contacts will need to be removed prior to surgery. You may want to bring your eye glasses to wear immediately before and after surgery. 14. Dentures will need to be removed before your procedure. 13. Bring cases for your glasses, contacts, dentures, or hearing aids to protect them while you are in surgery. 16. If you use a CPAP, please bring it with you on the day of your procedure. 17. Do not shave or wax for 72 hours prior to procedure near your operative site  18. FOR WOMAN OF CHILDBEARING AGE ONLY- please make sure we can collect a urine sample on arrival.     If you have further questions, you may contact your surgeon's office or us at [phone]     Left instructions on patient's voicemail.     Sree Trammell RN.8/3/2022 .11:43 AM

## 2022-08-03 NOTE — PROGRESS NOTES
Place patient label inside box (if no patient label, complete below)  Name:  :  MR#:   [facility] Kos / PROCEDURE  I (we), Low Celeste (Patient Name) authorize Jose Hawkins (Provider / Meghan Flaherty) and/or such assistants as may be selected by him/her, to perform the following operation/procedure(s): HEMORRHOIDECTOMY       Note: If unable to obtain consent prior to an emergent procedure, document the emergent reason in the medical record. This procedure has been explained to my (our) satisfaction and included in the explanation was: The intended benefit, nature, and extent of the procedure to be performed; The significant risks involved and the probability of success; Alternative procedures and methods of treatment; The dangers and probable consequences of such alternatives (including no procedure or treatment); The expected consequences of the procedure on my future health; Whether other qualified individuals would be performing important surgical tasks and/or whether  would be present to advise or support the procedure. I (we) understand that there are other risks of infection and other serious complications in the pre-operative/procedural and postoperative/procedural stages of my (our) care. I (we) have asked all of the questions which I (we) thought were important in deciding whether or not to undergo treatment or diagnosis. These questions have been answered to my (our) satisfaction. I (we) understand that no assurance can be given that the procedure will be a success, and no guarantee or warranty of success has been given to me (us). It has been explained to me (us) that during the course of the operation/procedure, unforeseen conditions may be revealed that necessitate extension of the original procedure(s) or different procedure(s) than those set forth in Paragraph 1.  I (we) authorize and request that the above-named physician, his/her assistants or his/her designees, perform procedures as necessary and desirable if deemed to be in my (our) best interest.     Revised 8/2/2021                                                                          Page 1 of 2         I acknowledge that health care personnel may be observing this procedure for the purpose of medical education or other specified purposes as may be necessary as requested and/or approved by my (our) physician. I (we) consent to the disposal by the hospital Pathologist of the removed tissue, parts or organs in accordance with hospital policy. I do ____ do not ____ consent to the use of a local infiltration pain blocking agent that will be used by my provider/surgical provider to help alleviate pain during my procedure. I do ____ do not ____ consent to an emergent blood transfusion in the case of a life-threatening situation that requires blood components to be administered. This consent is valid for 24 hours from the beginning of the procedure. This patient does ____ or does not ____ currently have a DNR status/order. If DNR order is in place, obtain Addendum to the Surgical Consent for ALL Patients with a DNR Order to address jen-operative status for limited intervention or DNR suspension.      I have read and fully understand the above Consent for Operation/Procedure and that all blanks were completed before I signed the consent.   _____________________________       _____________________      ____/____am/pm  Signature of Patient or legal representative      Printed Name / Relationship            Date / Time   ____________________________       _____________________      ____/____am/pm  Witness to Signature                                    Printed Name                    Date / Time    If patient is unable to sign or is a minor, complete the following)  Patient is a minor, ____ years of age, or unable to sign because: ______________________________________________________________________________________________    If a phone consent is obtained, consent will be documented by using two health care professionals, each affirming that the consenting party has no questions and gives consent for the procedure discussed with the physician/provider.   _____________________          ____________________       _____/_____am/pm   2nd witness to phone consent        Printed name           Date / Time    Informed Consent:  I have provided the explanation described above in section 1 to the patient and/or legal representative.  I have provided the patient and/or legal representative with an opportunity to ask any questions about the proposed operation/procedure.   ___________________________          ____________________         ____/____am/pm  Provider / Proceduralist                            Printed name            Date / Time  Revised 8/2/2021                                                                      Page 2 of 2

## 2022-08-08 ENCOUNTER — TELEPHONE (OUTPATIENT)
Dept: SURGERY | Age: 37
End: 2022-08-08

## 2022-08-08 ENCOUNTER — ANESTHESIA EVENT (OUTPATIENT)
Dept: OPERATING ROOM | Age: 37
End: 2022-08-08
Payer: COMMERCIAL

## 2022-08-08 NOTE — TELEPHONE ENCOUNTER
I have placed a reminder call to patient for upcoming procedure. Did you speak directly to patient or leave a voicemail? Spoke to patient    Prep? NPO 12AM      Must have a  that is over the age of 25. Must be a friend or family member that can be responsible for signing them out after surgery.     Arrive at the main entrance [facility] at 11:00AM

## 2022-08-09 ENCOUNTER — HOSPITAL ENCOUNTER (OUTPATIENT)
Age: 37
Setting detail: OUTPATIENT SURGERY
Discharge: HOME | End: 2022-08-09
Attending: SURGERY | Admitting: SURGERY
Payer: COMMERCIAL

## 2022-08-09 ENCOUNTER — ANESTHESIA (OUTPATIENT)
Dept: OPERATING ROOM | Age: 37
End: 2022-08-09
Payer: COMMERCIAL

## 2022-08-09 VITALS
HEIGHT: 67 IN | WEIGHT: 293 LBS | OXYGEN SATURATION: 93 % | DIASTOLIC BLOOD PRESSURE: 73 MMHG | BODY MASS INDEX: 45.99 KG/M2 | RESPIRATION RATE: 21 BRPM | TEMPERATURE: 97.4 F | HEART RATE: 95 BPM | SYSTOLIC BLOOD PRESSURE: 128 MMHG

## 2022-08-09 DIAGNOSIS — K64.9 HEMORRHOIDS, UNSPECIFIED HEMORRHOID TYPE: Primary | ICD-10-CM

## 2022-08-09 PROCEDURE — C9290 INJ, BUPIVACAINE LIPOSOME: HCPCS | Performed by: SURGERY

## 2022-08-09 PROCEDURE — 3600000004 HC SURGERY LEVEL 4 BASE: Performed by: SURGERY

## 2022-08-09 PROCEDURE — 7100000010 HC PHASE II RECOVERY - FIRST 15 MIN: Performed by: SURGERY

## 2022-08-09 PROCEDURE — 46260 REMOVE IN/EX HEM GROUPS 2+: CPT | Performed by: SURGERY

## 2022-08-09 PROCEDURE — 3700000001 HC ADD 15 MINUTES (ANESTHESIA): Performed by: SURGERY

## 2022-08-09 PROCEDURE — 7100000001 HC PACU RECOVERY - ADDTL 15 MIN: Performed by: SURGERY

## 2022-08-09 PROCEDURE — 2709999900 HC NON-CHARGEABLE SUPPLY: Performed by: SURGERY

## 2022-08-09 PROCEDURE — 7100000000 HC PACU RECOVERY - FIRST 15 MIN: Performed by: SURGERY

## 2022-08-09 PROCEDURE — 88304 TISSUE EXAM BY PATHOLOGIST: CPT

## 2022-08-09 PROCEDURE — 6360000002 HC RX W HCPCS: Performed by: NURSE ANESTHETIST, CERTIFIED REGISTERED

## 2022-08-09 PROCEDURE — 7100000011 HC PHASE II RECOVERY - ADDTL 15 MIN: Performed by: SURGERY

## 2022-08-09 PROCEDURE — 3600000014 HC SURGERY LEVEL 4 ADDTL 15MIN: Performed by: SURGERY

## 2022-08-09 PROCEDURE — 3700000000 HC ANESTHESIA ATTENDED CARE: Performed by: SURGERY

## 2022-08-09 PROCEDURE — 2500000003 HC RX 250 WO HCPCS: Performed by: NURSE ANESTHETIST, CERTIFIED REGISTERED

## 2022-08-09 PROCEDURE — 6360000002 HC RX W HCPCS: Performed by: SURGERY

## 2022-08-09 PROCEDURE — 2580000003 HC RX 258: Performed by: FAMILY MEDICINE

## 2022-08-09 RX ORDER — MIDAZOLAM HYDROCHLORIDE 1 MG/ML
INJECTION INTRAMUSCULAR; INTRAVENOUS PRN
Status: DISCONTINUED | OUTPATIENT
Start: 2022-08-09 | End: 2022-08-09 | Stop reason: SDUPTHER

## 2022-08-09 RX ORDER — FENTANYL CITRATE 50 UG/ML
INJECTION, SOLUTION INTRAMUSCULAR; INTRAVENOUS PRN
Status: DISCONTINUED | OUTPATIENT
Start: 2022-08-09 | End: 2022-08-09 | Stop reason: SDUPTHER

## 2022-08-09 RX ORDER — DEXAMETHASONE SODIUM PHOSPHATE 4 MG/ML
INJECTION, SOLUTION INTRA-ARTICULAR; INTRALESIONAL; INTRAMUSCULAR; INTRAVENOUS; SOFT TISSUE PRN
Status: DISCONTINUED | OUTPATIENT
Start: 2022-08-09 | End: 2022-08-09 | Stop reason: SDUPTHER

## 2022-08-09 RX ORDER — PROPOFOL 10 MG/ML
INJECTION, EMULSION INTRAVENOUS PRN
Status: DISCONTINUED | OUTPATIENT
Start: 2022-08-09 | End: 2022-08-09 | Stop reason: SDUPTHER

## 2022-08-09 RX ORDER — FLUOXETINE 10 MG/1
10 CAPSULE ORAL DAILY
COMMUNITY

## 2022-08-09 RX ORDER — DIPHENHYDRAMINE HYDROCHLORIDE 50 MG/ML
12.5 INJECTION INTRAMUSCULAR; INTRAVENOUS
Status: DISCONTINUED | OUTPATIENT
Start: 2022-08-09 | End: 2022-08-09 | Stop reason: HOSPADM

## 2022-08-09 RX ORDER — OXYCODONE HYDROCHLORIDE 5 MG/1
5 TABLET ORAL PRN
Status: DISCONTINUED | OUTPATIENT
Start: 2022-08-09 | End: 2022-08-09 | Stop reason: HOSPADM

## 2022-08-09 RX ORDER — ONDANSETRON 2 MG/ML
INJECTION INTRAMUSCULAR; INTRAVENOUS PRN
Status: DISCONTINUED | OUTPATIENT
Start: 2022-08-09 | End: 2022-08-09 | Stop reason: SDUPTHER

## 2022-08-09 RX ORDER — HYDRALAZINE HYDROCHLORIDE 20 MG/ML
10 INJECTION INTRAMUSCULAR; INTRAVENOUS
Status: DISCONTINUED | OUTPATIENT
Start: 2022-08-09 | End: 2022-08-09 | Stop reason: HOSPADM

## 2022-08-09 RX ORDER — OXYCODONE HYDROCHLORIDE 5 MG/1
10 TABLET ORAL PRN
Status: DISCONTINUED | OUTPATIENT
Start: 2022-08-09 | End: 2022-08-09 | Stop reason: HOSPADM

## 2022-08-09 RX ORDER — DOCUSATE SODIUM 100 MG/1
100 CAPSULE, LIQUID FILLED ORAL 2 TIMES DAILY
Qty: 60 CAPSULE | Refills: 0 | Status: SHIPPED | OUTPATIENT
Start: 2022-08-09 | End: 2022-09-08

## 2022-08-09 RX ORDER — LABETALOL HYDROCHLORIDE 5 MG/ML
10 INJECTION, SOLUTION INTRAVENOUS
Status: DISCONTINUED | OUTPATIENT
Start: 2022-08-09 | End: 2022-08-09 | Stop reason: HOSPADM

## 2022-08-09 RX ORDER — SODIUM CHLORIDE 9 MG/ML
INJECTION, SOLUTION INTRAVENOUS PRN
Status: DISCONTINUED | OUTPATIENT
Start: 2022-08-09 | End: 2022-08-09 | Stop reason: HOSPADM

## 2022-08-09 RX ORDER — SODIUM CHLORIDE 0.9 % (FLUSH) 0.9 %
5-40 SYRINGE (ML) INJECTION EVERY 12 HOURS SCHEDULED
Status: DISCONTINUED | OUTPATIENT
Start: 2022-08-09 | End: 2022-08-09 | Stop reason: HOSPADM

## 2022-08-09 RX ORDER — SODIUM CHLORIDE 9 MG/ML
25 INJECTION, SOLUTION INTRAVENOUS PRN
Status: DISCONTINUED | OUTPATIENT
Start: 2022-08-09 | End: 2022-08-09 | Stop reason: HOSPADM

## 2022-08-09 RX ORDER — METOCLOPRAMIDE HYDROCHLORIDE 5 MG/ML
10 INJECTION INTRAMUSCULAR; INTRAVENOUS
Status: DISCONTINUED | OUTPATIENT
Start: 2022-08-09 | End: 2022-08-09 | Stop reason: HOSPADM

## 2022-08-09 RX ORDER — OXYCODONE HYDROCHLORIDE AND ACETAMINOPHEN 5; 325 MG/1; MG/1
1-2 TABLET ORAL EVERY 6 HOURS PRN
Qty: 42 TABLET | Refills: 0 | Status: SHIPPED | OUTPATIENT
Start: 2022-08-09 | End: 2022-08-16

## 2022-08-09 RX ORDER — SODIUM CHLORIDE 0.9 % (FLUSH) 0.9 %
5-40 SYRINGE (ML) INJECTION PRN
Status: DISCONTINUED | OUTPATIENT
Start: 2022-08-09 | End: 2022-08-09 | Stop reason: HOSPADM

## 2022-08-09 RX ORDER — DIAZEPAM 5 MG/1
5 TABLET ORAL EVERY 8 HOURS PRN
Qty: 21 TABLET | Refills: 0 | Status: SHIPPED | OUTPATIENT
Start: 2022-08-09 | End: 2022-08-16

## 2022-08-09 RX ORDER — MEPERIDINE HYDROCHLORIDE 25 MG/ML
12.5 INJECTION INTRAMUSCULAR; INTRAVENOUS; SUBCUTANEOUS EVERY 5 MIN PRN
Status: DISCONTINUED | OUTPATIENT
Start: 2022-08-09 | End: 2022-08-09 | Stop reason: HOSPADM

## 2022-08-09 RX ORDER — DIAZEPAM 2 MG/1
5 TABLET ORAL EVERY 8 HOURS PRN
Qty: 21 TABLET | Refills: 0 | Status: SHIPPED | OUTPATIENT
Start: 2022-08-10 | End: 2022-08-09 | Stop reason: HOSPADM

## 2022-08-09 RX ORDER — SODIUM CHLORIDE, SODIUM LACTATE, POTASSIUM CHLORIDE, CALCIUM CHLORIDE 600; 310; 30; 20 MG/100ML; MG/100ML; MG/100ML; MG/100ML
INJECTION, SOLUTION INTRAVENOUS CONTINUOUS
Status: DISCONTINUED | OUTPATIENT
Start: 2022-08-09 | End: 2022-08-09 | Stop reason: HOSPADM

## 2022-08-09 RX ORDER — GLYCOPYRROLATE 0.2 MG/ML
INJECTION INTRAMUSCULAR; INTRAVENOUS PRN
Status: DISCONTINUED | OUTPATIENT
Start: 2022-08-09 | End: 2022-08-09 | Stop reason: SDUPTHER

## 2022-08-09 RX ADMIN — FENTANYL CITRATE 25 MCG: 50 INJECTION, SOLUTION INTRAMUSCULAR; INTRAVENOUS at 14:01

## 2022-08-09 RX ADMIN — SODIUM CHLORIDE, POTASSIUM CHLORIDE, SODIUM LACTATE AND CALCIUM CHLORIDE: 600; 310; 30; 20 INJECTION, SOLUTION INTRAVENOUS at 13:17

## 2022-08-09 RX ADMIN — PROPOFOL 70 MG: 10 INJECTION, EMULSION INTRAVENOUS at 13:50

## 2022-08-09 RX ADMIN — MIDAZOLAM HYDROCHLORIDE 2 MG: 2 INJECTION, SOLUTION INTRAMUSCULAR; INTRAVENOUS at 13:27

## 2022-08-09 RX ADMIN — DEXAMETHASONE SODIUM PHOSPHATE 4 MG: 4 INJECTION, SOLUTION INTRAMUSCULAR; INTRAVENOUS at 13:41

## 2022-08-09 RX ADMIN — GLYCOPYRROLATE 0.2 MG: 0.2 INJECTION INTRAMUSCULAR; INTRAVENOUS at 13:41

## 2022-08-09 RX ADMIN — PROPOFOL 240 MG: 10 INJECTION, EMULSION INTRAVENOUS at 13:35

## 2022-08-09 RX ADMIN — ONDANSETRON 4 MG: 2 INJECTION INTRAMUSCULAR; INTRAVENOUS at 13:41

## 2022-08-09 RX ADMIN — FENTANYL CITRATE 100 MCG: 50 INJECTION, SOLUTION INTRAMUSCULAR; INTRAVENOUS at 13:35

## 2022-08-09 RX ADMIN — FENTANYL CITRATE 75 MCG: 50 INJECTION, SOLUTION INTRAMUSCULAR; INTRAVENOUS at 13:57

## 2022-08-09 RX ADMIN — PROPOFOL 70 MG: 10 INJECTION, EMULSION INTRAVENOUS at 13:43

## 2022-08-09 ASSESSMENT — PAIN - FUNCTIONAL ASSESSMENT: PAIN_FUNCTIONAL_ASSESSMENT: 0-10

## 2022-08-09 ASSESSMENT — PAIN DESCRIPTION - LOCATION: LOCATION: RECTUM

## 2022-08-09 ASSESSMENT — PAIN DESCRIPTION - DESCRIPTORS: DESCRIPTORS: BURNING

## 2022-08-09 ASSESSMENT — PAIN SCALES - GENERAL: PAINLEVEL_OUTOF10: 4

## 2022-08-09 NOTE — PROGRESS NOTES
PACU Transfer to [facility]    Vitals:    08/09/22 1545   BP: (!) 133/90   Pulse: 91   Resp: 20   Temp: 97.5 °F (36.4 °C)   SpO2: 95%         Intake/Output Summary (Last 24 hours) at 8/9/2022 1606  Last data filed at 8/9/2022 1422  Gross per 24 hour   Intake 900 ml   Output --   Net 900 ml       Pain assessment:    Pain Level: 0    BP within 20% range.     Patient transferred to care of DAVID RN.    8/9/2022 4:06 PM

## 2022-08-09 NOTE — DISCHARGE INSTRUCTIONS
POST HEMORRHOID SURGERY CARE    Call Surgeon if you have:  Temperature greater than 101.4  Persistent nausea and vomiting  Severe uncontrolled pain  Redness, tenderness, or signs of infection (pain, swelling, redness, odor or green/yellow discharge around the site)  Difficulty breathing, headache or visual disturbances  Hives  Persistent dizziness or light-headedness  Extreme fatigue  Inability to urinate  Any other questions or concerns you may have after discharge    In an emergency, call 911 or go to an Emergency Department at a nearby hospital    It is important to bring a complete, current list of your medications to any medical appointments or hospitalizations. REMINDER:   Carry a list of your medications and allergies with you at all times  Call your pharmacy at least 1 week in advance to refill prescriptions    Diet: Resume your usual diet. Consider mostly liquids over first 3 days post surgery. Good nutrition promotes healing. Increase fluid intake. Take stool softener twice daily but hold if diarrhea develops. You may not have a bowel movement for several days after hemorrhoid surgery. This is normal unless you feel stool is stuck at the exit and can't get out. Pain medication: You may noticed increased pain in 48-72 hours when local numbing medication wears off. Wait until the first postoperative day to start the valium to avoid excessive sedation. Do not take pain medication and valium at the same time to avoid excessive sedation. Wound Care: A small amount of bleeding is expected and normal. If severe bleeding develops please return to ER. You may wear a pad to protect your clothes. Warm bath 2-3 times daily will help with pain. You may also apply ice pack 10-15 minutes per hour while awake for pain. A small dissolvable sponge was placed in your bottom at the end of surgery. This may fall out prior to dissolving. Activity:  Avoid heavy lifting (15 pounds) / straining for 1 week.  We do not recommend hemorrhoid donut cushions but you may sit on a regular pillow. You may resume regular exercise in 1 weeks time. If still having pain or bleeding consider waiting until 2 weeks time. Follow-up: Call [phone] for follow-up post op appointment. Plan to see Dr. Pena Fees 2-3 weeks     Supplies & Equipment: None  Education Materials Received: no  Belongings Returned: yes  Home medications Returned:yes      I understand and acknowledge receipt of the above instructions. Patient or Guardian Signature                                                         Date/Time                                                                                                                                            Physician's or R.N.'s Signature                                                                  Date/Time      The discharge instructions have been reviewed with the patient and/or Guardian. Patient and/or Guardian signed and retained a printed copy. Your Surgeon or Anesthesiologist gave you Exparel     Exparel (bupivicaine liposome injectable suspension) is a medication injected into the surgical incision  just before the end of the procedure by your surgeon to help control your pain after surgery. It can also be given as a nerve block by the anesthesiologist. This local anesthetic provides pain relief by numbing the tissue around the surgical site. Exparel releases pain medication over time lasting up to 5 days or 120 hours. Exparel may cause a temporary loss of sensation or the ability to move in the area where the medication was injected. Side effects of Exparel that may occur include nausea, vomiting or constipation. Call immediately if you experience numbness or tingling of the mouth or lips, lightheadedness or severe anxiety.   Notify your physician if you experience these or any other possible side effects of the medication. Note: Other forms of bupivacaine should not be administered within 96 hours (4 days) following administration of Exparel. Please keep ID band in place for 96 hours (4 days). [address]      There are potential side effects of anesthesia or sedation you may experience for the first 24 hours. These side effects include:    Confusion or Memory loss, Dizziness, or Delayed Reaction Times   [x]A responsible person should be with you for the next 24 hours. Do not operate any vehicles (automobiles, bicycles, motorcycles) or power tools or machinery for 24 hours. Do not sign any legal documents or make any legal decisions for 24 hours. Do not drink alcohol for 24 hours or while taking narcotic pain medication. Nausea    [x]Start with light diet and progress to your normal diet as you feel like eating. However, if you experience nausea or repeated episodes of vomiting which persist beyond 12-24 hours, notify your physician. Once nausea has passed, remember to keep drinking fluids. Difficulty Passing Urine  [x]Drink extra amounts of fluid today. Notify your physician if you have not urinated within 8 hours after your procedure or you feel uncomfortable. Irritated Throat from a Breathing Tube  [x]Drink extra amounts of fluid today. Lozenges may help. Muscle Aches  [x]You may experience some generalized body aches as your muscles recover from medications used to relax them during surgery. These will gradually subside. MEDICATION INSTRUCTIONS:  [x]Prescription(S) x  3   sent with you. Use as directed. When taking pain medications, you may experience the side effect of dizziness or drowsiness. Do not drink alcohol or drive when taking these medications.   []Prescription(S) x          Called to Pharmacy Name and location:    [x]Give the list of your medications to your primary care physician on your next visit. Keep your med list updated and carry it with in case of emergencies. [x] Narcotic pain medications can cause the side effect of significant constipation. You may want to add a stool softener to your postoperative medication schedule or speak to your surgeon on how best to manage this side effect. NARCOTIC SAFETY:  Your pain medicine is only for you to take. Safely store your medicines. Store pills up high and out of reach of children and pets. Ensure safety caps are snapped tightly  Keep track of how many pills you have left    Unused medication can be disposed of by taking them to a drop-off box or take-back program that is authorized by the [facility]. Access to a site near you can be found on the [facility] website ([address] [facility]). If you have a CPAP machine, it is very important that you use it daily during all periods of sleep and daytime rest during your recovery at home. Surgery and Anesthesia place a significant amount of stress on your body. Using your CPAP will help keep you safe and lessen the negative effects of that stress. FOLLOW-UP RECOVERY CARE:  [x]Call the office at [phone] for follow-up appointment in 2-3 weeks and for any problems. Watch for these possible complications, symptoms, or side effects of anesthesia. Call physician if they or any other problems occur:  Signs of INFECTION   > Fever over 101°     > Redness, swelling, hardness or warmth at the operative site   >Foul smelling or cloudy drainage at the operative site   Unrelieved PAIN  Unrelieved NAUSEA  Blood soaked dressing. (Some oozing may be normal)  Inability to urinate      Numb, pale, blue, cold or tingling extremity      Physician:  Dr. Esme Pineda       The above instructions were reviewed with patient/significant other.   The following additional patient specific information was reviewed with the patient/significant other: [x]Procedure/physician specific instructions  [x]Medication information sheet(S) including potential side effects  []Renates egress test  []Pain Ball management  []FAQ Catheter associated blood stream infections  []FAQ Surgical Site Infections  []Other-    I have read and understand the instructions given to me: ____________________________________________   (Patient/S.O. Signature)            Date/time 8/9/2022 2:51 PM         PACU:  [phone]   M-F 700 AM - 7 PM      SAME DAY SERVICES:  [phone] M-F 7AM-6PM        If you smoke STOP. We care about your health!

## 2022-08-09 NOTE — BRIEF OP NOTE
Brief Postoperative Note      Patient: Venkata Bonds  YOB: 1985  MRN: [id number]    Date of Procedure: 8/9/2022    Pre-Op Diagnosis: Hemorrhoids, unspecified hemorrhoid type [K64.9]    Post-Op Diagnosis: Same       Procedure(s):   HEMORRHOIDECTOMY    Surgeon(s):  Adrian Mcnamara MD    Assistant:  Resident: Madelin Orr MD    Anesthesia: General    Estimated Blood Loss (mL): less than 50     Complications: None    Specimens:   ID Type Source Tests Collected by Time Destination   A : hemorrhoids  x 3 Tissue Tissue SURGICAL PATHOLOGY Adrian Mcnamara MD 8/9/2022 1416            Findings: 3 hemorrhoid columns removed     Electronically signed by Adrian Mcnamara MD on 8/9/2022 at 2:22 PM

## 2022-08-09 NOTE — H&P
Terris Hamman    [id number]    [facility] Same Day Surgery Update H & P  Department of General Surgery   Surgical Service   Pre-operative History and Physical  Last H & P within the last 30 days. DIAGNOSIS:   Hemorrhoids, unspecified hemorrhoid type [K64.9]    Procedure(s): HEMORRHOIDECTOMY    History obtained from: Patient interview and EHR      HISTORY OF PRESENT ILLNESS:   Patient is a morbidly obese (Body mass index is 52.16 kg/m².), 42 y.o. female with c/o pain and bleeding due to hemorrhoids presents today for the above procedure. Illness Screening: Patient denies fever, chills, worsening cough, or close contact with sick individuals. Past Medical History:    Past medical history reviewed. No pertinent past medical history. Past Surgical History:        Procedure Laterality Date    CARPAL TUNNEL RELEASE      FACIAL SURGERY      HYSTERECTOMY (CERVIX STATUS UNKNOWN)      OSTEOTOMY Right 9/4/2020    PLANTAR PLATE REPAIR RIGHT FOOT, WEIL OSTEOTOMY RIGHT 2ND DIGIT, METATARSAL PHALANGEAL JOINT CAPSULOTOMY performed by Chandler Velásquez DPM at [address]       Medications Prior to Admission:      Prior to Admission medications    Medication Sig Start Date End Date Taking? Authorizing Provider   buPROPion (WELLBUTRIN) 100 MG tablet Take 100 mg by mouth in the morning and 100 mg before bedtime. Historical Provider, MD   amphetamine-dextroamphetamine (ADDERALL, 30MG,) 30 MG tablet Take 30 mg by mouth in the morning.     Historical Provider, MD   ibuprofen (ADVIL;MOTRIN) 800 MG tablet Take 1 tablet by mouth every 8 hours as needed for Pain  Patient taking differently: Take 800 mg by mouth as needed for Pain 9/4/20 7/29/22  Magdalena Beyer DPM         Allergies:  Hydrocodone-acetaminophen    PHYSICAL EXAM:      BP (!) 140/92   Pulse 96   Temp 97.6 °F (36.4 °C) (Temporal)   Resp 16   Ht 5' 7\" (1.702 m)   Wt (!) 333 lb (151 kg)   BMI 52.16 kg/m²      Airway:  Airway patent with no audible stridor    Heart:  Regular rate and rhythm, No murmur noted    Lungs:  No increased work of breathing, good air exchange, clear to auscultation bilaterally, no crackles or wheezing    Abdomen:  Soft, non-distended, non-tender, no rebound tenderness or guarding, and no masses palpated    ASSESSMENT AND PLAN     Patient is a 42 y.o. female with above specified procedure planned. 1.  Patient seen and focused exam done today- no new changes since last physical exam on 7/26/22    2. Access to ancillary services are available per request of the provider.      Alejos Ormond, APRN - CNP     8/9/2022

## 2022-08-09 NOTE — PROGRESS NOTES
Pt on a bedside commode and was able to void freely. Let her put her underwear on with abdominal pad in place.

## 2022-08-09 NOTE — ANESTHESIA PRE PROCEDURE
Department of Anesthesiology  Preprocedure Note       Name:  Timbo Molina   Age:  42 y.o.  :  1985                                          MRN:  [id number]         Date:  2022      Surgeon: Isabel Chapman):  Valentino Fan MD    Procedure: Procedure(s): HEMORRHOIDECTOMY    Medications prior to admission:   Prior to Admission medications    Medication Sig Start Date End Date Taking? Authorizing Provider   buPROPion (WELLBUTRIN) 100 MG tablet Take 100 mg by mouth in the morning and 100 mg before bedtime. Historical Provider, MD   amphetamine-dextroamphetamine (ADDERALL, 30MG,) 30 MG tablet Take 30 mg by mouth in the morning. Historical Provider, MD   ibuprofen (ADVIL;MOTRIN) 800 MG tablet Take 1 tablet by mouth every 8 hours as needed for Pain  Patient taking differently: Take 800 mg by mouth as needed for Pain 20  Oneyda Zamudio DPM       Current medications:    Current Facility-Administered Medications   Medication Dose Route Frequency Provider Last Rate Last Admin    lactated ringers infusion   IntraVENous Continuous Melvin Paniagua MD        sodium chloride flush 0.9 % injection 5-40 mL  5-40 mL IntraVENous 2 times per day Melvin Paniagua MD        sodium chloride flush 0.9 % injection 5-40 mL  5-40 mL IntraVENous PRN Melvin Paniagua MD        0.9 % sodium chloride infusion   IntraVENous PRN Melvin Paniagua MD           Allergies: Allergies   Allergen Reactions    Hydrocodone-Acetaminophen Nausea And Vomiting       Problem List:  There is no problem list on file for this patient. Past Medical History:  No past medical history on file.     Past Surgical History:        Procedure Laterality Date    CARPAL TUNNEL RELEASE      FACIAL SURGERY      HYSTERECTOMY (CERVIX STATUS UNKNOWN)      OSTEOTOMY Right 2020    PLANTAR PLATE REPAIR RIGHT FOOT, WEIL OSTEOTOMY RIGHT 2ND DIGIT, METATARSAL PHALANGEAL JOINT CAPSULOTOMY performed by Kang Artis DPM at [address] Results   Component Value Date/Time    COVID19 NOT DETECTED 08/31/2020 07:45 PM           Anesthesia Evaluation  Patient summary reviewed and Nursing notes reviewed no history of anesthetic complications:   Airway: Mallampati: II  TM distance: >3 FB   Neck ROM: full  Mouth opening: > = 3 FB   Dental:          Pulmonary:Negative Pulmonary ROS                              Cardiovascular:Negative CV ROS                      Neuro/Psych:   Negative Neuro/Psych ROS              GI/Hepatic/Renal:   (+) morbid obesity          Endo/Other:                     Abdominal:             Vascular: negative vascular ROS. Other Findings:           Anesthesia Plan      general     ASA 1    (38-year-old female presents for HEMORRHOIDECTOMY. Plan general anesthesia with ASA standard monitors. Questions answered. Patient agreeable with anesthetic plan.  )  Induction: intravenous. Anesthetic plan and risks discussed with patient. Plan discussed with CRNA.     Attending anesthesiologist reviewed and agrees with Deya Boston MD   8/9/2022

## 2022-08-09 NOTE — PROGRESS NOTES
Awake but still drowsy, taking ice chips and tolerating well. States she wants to urinate, bedpan applied since she's still drowsy.

## 2022-08-09 NOTE — ANESTHESIA POSTPROCEDURE EVALUATION
Department of Anesthesiology  Postprocedure Note    Patient: Den Thompson  MRN: [id number]  YOB: 1985  Date of evaluation: 8/9/2022      Procedure Summary     Date: 08/09/22 Room / Location: [address]    Anesthesia Start: 1330 Anesthesia Stop: 2040    Procedure: HEMORRHOIDECTOMY (Rectum) Diagnosis:       Hemorrhoids, unspecified hemorrhoid type      (Hemorrhoids, unspecified hemorrhoid type [K64.9])    Surgeons: Jr Zhao MD Responsible Provider: Belinda Madrid MD    Anesthesia Type: general ASA Status: 3          Anesthesia Type: No value filed.     Jeniffer Phase I: Jeniffer Score: 5    Jeniffer Phase II:        Anesthesia Post Evaluation    Patient location during evaluation: PACU  Patient participation: complete - patient participated  Level of consciousness: awake and alert  Pain score: 0  Airway patency: patent  Nausea & Vomiting: no nausea and no vomiting  Complications: no  Cardiovascular status: hemodynamically stable  Respiratory status: acceptable  Hydration status: euvolemic

## 2022-08-09 NOTE — OP NOTE
Operative Note        Patient: Neelam Shell  YOB: 1985  MRN: [id number]    Date of Procedure: 8/9/2022    Pre-Op Diagnosis: Hemorrhoids, unspecified hemorrhoid type [K64.9]    Post-Op Diagnosis: Same       Procedure(s): HEMORRHOIDECTOMY    Surgeon(s):  Herbert Dover MD    Assistant:  Resident: Diego Snyder MD    Anesthesia: General    Estimated Blood Loss (mL): less than 50     Complications: None    Specimens:   ID Type Source Tests Collected by Time Destination   A : hemorrhoids  x 3 Tissue Tissue SURGICAL PATHOLOGY Herbert Dover MD 8/9/2022 1416      Findings: 3 hemorrhoid columns removed     After informed consent was obtained the patient was taken to the operating room. General anesthesia was given. Time out was called to confirm key components. The patient was placed in the lithotomy position with appropriate padding. The patient was then prepped and draped in the usual sterile fashion. We saw several large hemorrhoids with thickening and whitening of the mucosa consistent with chronic prolapse. We placed a hemorrhoid clamp on the largest lesion and elevated it away from the underlying structures. We then placed a 3-0 chromic suture at the internal apex of the hemorrhoid. We then divided the mucosa using a 15 scalpel blade. We divided the rest of the hemorrhoid with Ligasure. The 3-0 chromic was then used in a running locking fashion to close the defect with care taken to approximate mucosa to mucosa and skin to skin. There was slight oozing from the proximal aspect of the wound that was controlled with a second chromic stitch. A small amount of redundant skin was excised for better cosmetic effect and the distal aspect the incision left slightly open for drainage. Two additional hemorrhoids were removed from the right posterior and anterior areas. A left posterior hemorrhoid was not removed to avoid excess scarring. Hemostasis was seen at the conclusion of the case.       We injected Exparel for post operative pain control. A rolled gelfoam was placed in the anus to promote hemostasis. No other anorectal pathology seen.       Dr. Miky Zarate was present and scrubbed and performed all aspects of the operation      Electronically signed by Marichuy Mane MD on 8/9/2022 at 2:22 PM

## (undated) DEVICE — GOWN,SIRUS,POLYRNF,BRTHSLV,XL,30/CS: Brand: MEDLINE

## (undated) DEVICE — POSITIONER HD REST FOAM CMFRT TCH

## (undated) DEVICE — APPLICATOR MEDICATED 26 CC SOLUTION HI LT ORNG CHLORAPREP

## (undated) DEVICE — GLOVE ORANGE PI 7 1/2   MSG9075

## (undated) DEVICE — SUTURE NONABSORBABLE MONOFILAMENT 4-0 FS-2 18 IN ETHILON 662H

## (undated) DEVICE — PLATE ES AD W 9FT CRD 2

## (undated) DEVICE — DRAPE EQUIP FOOTSWITCH 24X20 IN PUL CORDAND CRD LCK NS

## (undated) DEVICE — GLOVE SURG SZ 8 L12IN FNGR THK79MIL GRN LTX FREE

## (undated) DEVICE — GLOVE ORANGE PI 8 1/2   MSG9085

## (undated) DEVICE — Device: Brand: GRAVITY

## (undated) DEVICE — SURGIFOAM SPNG SZ 100

## (undated) DEVICE — JEWISH HOSPITAL TURNOVER KIT: Brand: MEDLINE INDUSTRIES, INC.

## (undated) DEVICE — GLOVE ORANGE PI 8   MSG9080

## (undated) DEVICE — PODIATRY PK

## (undated) DEVICE — COVER,MAYO STAND,XL,STERILE: Brand: MEDLINE

## (undated) DEVICE — BLADE SAW W4.5XL25.5MM THK0.4MM CUT THK0.6MM MIC SAG FN

## (undated) DEVICE — PACK LAP IV REINF TBL CVR ADH UTIL UNDERBUTTOCK DRP W CUF

## (undated) DEVICE — SYRINGE MED 10ML LUERLOCK TIP W/O SFTY DISP

## (undated) DEVICE — COVER LT HNDL BLU PLAS

## (undated) DEVICE — SOLUTION IV 1000ML 0.9% SOD CHL

## (undated) DEVICE — DRAPE,UNDERBUTTOCKS,PCH,STERILE: Brand: MEDLINE

## (undated) DEVICE — E-Z CLEAN, NON-STICK, PTFE COATED, ELECTROSURGICAL BLADE ELECTRODE, 2.5 INCH (6.35 CM): Brand: EZ CLEAN

## (undated) DEVICE — TOWEL,STOP FLAG GOLD N-W: Brand: MEDLINE

## (undated) DEVICE — RECTAL: Brand: MEDLINE INDUSTRIES, INC.

## (undated) DEVICE — SUTURE VCRL SZ 3-0 L27IN ABSRB UD L26MM SH 1/2 CIR J416H

## (undated) DEVICE — BLADE SAW SM W10XL18.5MM THK4MM OSC

## (undated) DEVICE — SUTURE CHROMIC GUT SZ 3-0 L27IN ABSRB BRN L26MM SH 1/2 CIR G122H

## (undated) DEVICE — SUTURE VCRL 5-0 L18IN ABSRB UD PS-2 L19MM 1/2 CIR J495H

## (undated) DEVICE — DRAPE C ARM W54XL84IN MINI FOR OEC 6800

## (undated) DEVICE — INTENDED FOR TISSUE SEPARATION, AND OTHER PROCEDURES THAT REQUIRE A SHARP SURGICAL BLADE TO PUNCTURE OR CUT.: Brand: BARD-PARKER ® CARBON RIB-BACK BLADES

## (undated) DEVICE — SUTURE NONABSORBABLE MONOFILAMENT 4-0 PS-2 18 IN BLK ETHILON 1667G

## (undated) DEVICE — ZIMMER® STERILE DISPOSABLE TOURNIQUET CUFF WITH PLC, DUAL PORT, SINGLE BLADDER, 18 IN. (46 CM)

## (undated) DEVICE — SUTURE VCRL SZ 4-0 L27IN ABSRB UD L26MM SH 1/2 CIR J415H